# Patient Record
Sex: FEMALE | Race: WHITE | NOT HISPANIC OR LATINO | Employment: OTHER | URBAN - METROPOLITAN AREA
[De-identification: names, ages, dates, MRNs, and addresses within clinical notes are randomized per-mention and may not be internally consistent; named-entity substitution may affect disease eponyms.]

---

## 2024-07-08 NOTE — PROGRESS NOTES
"Subjective   Patient ID: Radha Kelsey is a 58 y.o. female    Chief Complaint:   Chief Complaint   Patient presents with    Left Shoulder - Pain        Last Surgery: No surgery found  Date of Last Surgery: No surgery found    HPI  Radha Kelsey is a 58 y.o. right hand dominant female presenting for their left shoulder.    pain started earlier this year  does not recall an injury  progressively gotten worse  pain is up there \"with child birth\"  Activities such as breaking spaghetti, cleaning or reaching behind her back is painful  pain during sleep  OTC medications do not help     currently retired substitute volunteer    Objective   Patient is a well-developed, well-nourished female in no acute distress.  Breathes with normal chest rises.  Pupils are round and symmetric today.  Awake, alert, and oriented x3.      Examination of the right shoulder today reveals the skin to be intact. There is no sign of any atrophy, lesions, or abrasions. There is no pain to palpation of the bony prominences. Cervical lymphadenopathy examined, and this was negative. Patient had 5 out of 5 wrist flexion, extension, and thumb extension bilaterally. Sensation was intact to light touch to median, ulnar, radial axillary, and musculocutaneous nerves bilaterally. Positive radial pulse bilaterally. Provocative maneuvers today were negative. Range of motion of the right shoulder revealed 0-170° of forward elevation, 0-60° of external rotation, and internal rotation was to T-12.     Examination of the left shoulder today reveals the skin to be intact. There is no sign of any atrophy, lesions, or abrasions. There is no pain to palpation of the bony prominences. Cervical lymphadenopathy examined, and this was negative. Patient had 5 out of 5 wrist flexion, extension, and thumb extension, bilaterally. Sensation was intact to light touch to median, ulnar, radial, axillary, and musculocutaneous nerves bilaterally. Positive radial pulse bilaterally. " Provocative maneuvers today were negative.  Range of motion of the left shoulder revealed 0-170° of forward elevation, 0-60° of external rotation, and internal rotation up to T-12.      Imaging:  X-rays personally ordered and interpreted by me show no signs of any fracture, dislocation, arthritis or proximal humerus migration.      Patient ID: Radha Kelsey is a 58 y.o. female.    L Inj/Asp: L subacromial bursa on 7/9/2024 12:00 PM  Indications: pain  Details: 20 G needle, anterior approach  Medications: 40 mg triamcinolone acetonide 40 mg/mL; 4 mL lidocaine 10 mg/mL (1 %)  Outcome: tolerated well, no immediate complications  Procedure, treatment alternatives, risks and benefits explained, specific risks discussed. Consent was given by the patient. Immediately prior to procedure a time out was called to verify the correct patient, procedure, equipment, support staff and site/side marked as required. Patient was prepped and draped in the usual sterile fashion.           Assessment/Plan   Left shoulder pain, unspecified chronicity  Patient with left shoulder rotator cuff disease    At this time, we had a very long discussion with the patient regarding the diagnosis. Rotator cuff disease is a spectrum of disorders ranging from rotator cuff tendinitis to full-thickness rotator cuff tears. We know that some patients over the age of 50 will have symptomatic rotator cuff tears just due to overuse and general wear and tear. In general, most patients who come in with complaints such as these will get better with therapy and injections alone. The therapy should be performed 2-3 times a day and should take about 10-15 minutes to perform each time.      We had a long discussion regarding her diagnosis. We talked about her treatment options. She was offered an injection today. She tolerated this well. We also provided her with our at-home exercises to do daily. We will see her back in 3 months for consideration of a repeat  injection at that time.    Orders Placed This Encounter    XR shoulder left 2+ views     Follow up in 3 months     Scribe Attestation  By signing my name below, I, Mei Lofton   attest that this documentation has been prepared under the direction and in the presence of Enzo Rodriguez MD.

## 2024-07-09 ENCOUNTER — OFFICE VISIT (OUTPATIENT)
Dept: ORTHOPEDIC SURGERY | Facility: HOSPITAL | Age: 59
End: 2024-07-09
Payer: COMMERCIAL

## 2024-07-09 ENCOUNTER — HOSPITAL ENCOUNTER (OUTPATIENT)
Dept: RADIOLOGY | Facility: HOSPITAL | Age: 59
Discharge: HOME | End: 2024-07-09
Payer: COMMERCIAL

## 2024-07-09 VITALS — WEIGHT: 165 LBS | BODY MASS INDEX: 25.9 KG/M2 | HEIGHT: 67 IN

## 2024-07-09 DIAGNOSIS — M25.512 LEFT SHOULDER PAIN, UNSPECIFIED CHRONICITY: Primary | ICD-10-CM

## 2024-07-09 DIAGNOSIS — M25.512 LEFT SHOULDER PAIN, UNSPECIFIED CHRONICITY: ICD-10-CM

## 2024-07-09 PROCEDURE — 2500000004 HC RX 250 GENERAL PHARMACY W/ HCPCS (ALT 636 FOR OP/ED): Performed by: ORTHOPAEDIC SURGERY

## 2024-07-09 PROCEDURE — 73030 X-RAY EXAM OF SHOULDER: CPT | Mod: LEFT SIDE | Performed by: RADIOLOGY

## 2024-07-09 PROCEDURE — 99213 OFFICE O/P EST LOW 20 MIN: CPT | Performed by: ORTHOPAEDIC SURGERY

## 2024-07-09 PROCEDURE — 20610 DRAIN/INJ JOINT/BURSA W/O US: CPT | Mod: LT | Performed by: ORTHOPAEDIC SURGERY

## 2024-07-09 PROCEDURE — 2500000005 HC RX 250 GENERAL PHARMACY W/O HCPCS: Performed by: ORTHOPAEDIC SURGERY

## 2024-07-09 PROCEDURE — 73030 X-RAY EXAM OF SHOULDER: CPT | Mod: LT

## 2024-07-09 RX ORDER — TRAZODONE HYDROCHLORIDE 50 MG/1
TABLET ORAL
COMMUNITY
Start: 2022-07-24

## 2024-07-09 RX ORDER — LEVOTHYROXINE SODIUM 112 UG/1
TABLET ORAL
COMMUNITY

## 2024-07-09 ASSESSMENT — PAIN SCALES - GENERAL: PAINLEVEL_OUTOF10: 9

## 2024-07-09 ASSESSMENT — PAIN - FUNCTIONAL ASSESSMENT: PAIN_FUNCTIONAL_ASSESSMENT: 0-10

## 2024-07-11 RX ORDER — LIDOCAINE HYDROCHLORIDE 10 MG/ML
4 INJECTION INFILTRATION; PERINEURAL
Status: COMPLETED | OUTPATIENT
Start: 2024-07-09 | End: 2024-07-09

## 2024-07-11 RX ORDER — TRIAMCINOLONE ACETONIDE 40 MG/ML
40 INJECTION, SUSPENSION INTRA-ARTICULAR; INTRAMUSCULAR
Status: COMPLETED | OUTPATIENT
Start: 2024-07-09 | End: 2024-07-09

## 2024-10-07 NOTE — PROGRESS NOTES
"Subjective   Patient ID: Radha Kelsey is a 58 y.o. female    Chief Complaint:   No chief complaint on file.       Last Surgery: No surgery found  Date of Last Surgery: No surgery found    HPI  Radha Kelsey is a 58 y.o. right hand dominant female presenting for their left shoulder.    Her pain started earlier this year. She does not recall an injury. Her shoulder has progressively gotten worse. She describes the as being \"up there with child birth\". Activities such as breaking spaghetti, cleaning or reaching behind her back is painful. She has pain at night with sleep. OTC medications do not help     currently retired substitute volunteer    10/7/24  Radha Kelsey is a 58 y.o. female returning to the clinic for a follow up visit regarding her left shoulder.    She continues to have pain. In August, she could barely move her arm. She called our office and was put on the wait list at that time. Her shoulder has improved since then and is about where her shoulder was in July.     Objective   Patient is a well-developed, well-nourished female in no acute distress.  Breathes with normal chest rises.  Pupils are round and symmetric today.  Awake, alert, and oriented x3.      Examination of the right shoulder today reveals the skin to be intact. There is no sign of any atrophy, lesions, or abrasions. There is no pain to palpation of the bony prominences. Cervical lymphadenopathy examined, and this was negative. Patient had 5 out of 5 wrist flexion, extension, and thumb extension bilaterally. Sensation was intact to light touch to median, ulnar, radial axillary, and musculocutaneous nerves bilaterally. Positive radial pulse bilaterally. Provocative maneuvers today were negative. Range of motion of the right shoulder revealed 70-80° of forward elevation passively, 0-10° of external rotation, and internal rotation was to T-12.     Examination of the left shoulder today reveals the skin to be intact. There is no sign of any " atrophy, lesions, or abrasions. There is no pain to palpation of the bony prominences. Cervical lymphadenopathy examined, and this was negative. Patient had 5 out of 5 wrist flexion, extension, and thumb extension, bilaterally. Sensation was intact to light touch to median, ulnar, radial, axillary, and musculocutaneous nerves bilaterally. Positive radial pulse bilaterally. Provocative maneuvers today were negative.  Range of motion of the left shoulder revealed 0-170° of forward elevation, 0-60° of external rotation, and internal rotation up to T-12.      Imaging:  X-rays personally ordered and interpreted by me show no signs of any fracture, dislocation, arthritis or proximal humerus migration.        Assessment/Plan   No diagnosis found.  Patient with left shoulder rotator cuff disease now developing frozen shoulder    At this time, we had a long discussion regarding their diagnosis. The natural history of frozen shoulder was discussed at length, including the fact that it resolves on its own over a 2-3-year time frame. Interventions for frozen shoulder include therapy, injections, and if that fails, arthroscopic release of the capsule. Operative intervention is reserved for those that cannot improve their range of motion or pain after 6-8 months of treatment.  We discussed the fact that a good analogy is the capsule acting like a Saran wrap. In the frozen shoulder, Saran wrap is shrunk by a hairdryer, and that is how the capsule acts in frozen shoulder, limiting shoulder motion in all planes.  We also discussed that patients are at increased risk for frozen shoulder if they have a family history of diabetes or thyroid disease.      In general most patients who come in with complaints such as these will get better with home therapy of stretching, NSAIDs and injections alone. Surgical intervention of arthroscopic release of the capsule is reserved for those individuals that do not improve their range of motion with  conservative approach or continue to have pain after 6-8 months of treatment.     We have given the patient our written provider directed home exercise program, along with correlating website for home therapy. The stretching therapy should be performed 2-3 times a day and should take about 10-15 minutes to perform each time.     They can be seen again in clinic in 4 weeks for a repeat clinical check if needed. If symptoms improve, they can see us back on an as-needed basis. All patient's questions were answered to their satisfaction today and they are comfortable with the above plan.     Over the next month I want her to continue to push her shoulder. If she continues to get worse into November, she will complete an MRI.     No orders of the defined types were placed in this encounter.    Follow up in 1 month     Scribe Attestation  By signing my name below, ICayla Scribe   attest that this documentation has been prepared under the direction and in the presence of Enzo Rodriguez MD.

## 2024-10-08 ENCOUNTER — OFFICE VISIT (OUTPATIENT)
Dept: ORTHOPEDIC SURGERY | Facility: HOSPITAL | Age: 59
End: 2024-10-08
Payer: COMMERCIAL

## 2024-10-08 DIAGNOSIS — M75.02 ADHESIVE CAPSULITIS OF LEFT SHOULDER: Primary | ICD-10-CM

## 2024-10-08 PROCEDURE — 99214 OFFICE O/P EST MOD 30 MIN: CPT | Performed by: ORTHOPAEDIC SURGERY

## 2024-11-01 ENCOUNTER — APPOINTMENT (OUTPATIENT)
Dept: RADIOLOGY | Facility: CLINIC | Age: 59
End: 2024-11-01
Payer: COMMERCIAL

## 2024-11-07 ENCOUNTER — APPOINTMENT (OUTPATIENT)
Dept: ORTHOPEDIC SURGERY | Facility: HOSPITAL | Age: 59
End: 2024-11-07
Payer: COMMERCIAL